# Patient Record
Sex: MALE | Race: BLACK OR AFRICAN AMERICAN | NOT HISPANIC OR LATINO | URBAN - METROPOLITAN AREA
[De-identification: names, ages, dates, MRNs, and addresses within clinical notes are randomized per-mention and may not be internally consistent; named-entity substitution may affect disease eponyms.]

---

## 2023-02-18 ENCOUNTER — EMERGENCY (EMERGENCY)
Facility: HOSPITAL | Age: 68
LOS: 1 days | Discharge: HOME | End: 2023-02-18
Attending: STUDENT IN AN ORGANIZED HEALTH CARE EDUCATION/TRAINING PROGRAM | Admitting: STUDENT IN AN ORGANIZED HEALTH CARE EDUCATION/TRAINING PROGRAM
Payer: MEDICARE

## 2023-02-18 VITALS
OXYGEN SATURATION: 100 % | HEART RATE: 92 BPM | SYSTOLIC BLOOD PRESSURE: 163 MMHG | DIASTOLIC BLOOD PRESSURE: 133 MMHG | RESPIRATION RATE: 19 BRPM | TEMPERATURE: 98 F

## 2023-02-18 VITALS
RESPIRATION RATE: 16 BRPM | TEMPERATURE: 98 F | SYSTOLIC BLOOD PRESSURE: 209 MMHG | OXYGEN SATURATION: 99 % | DIASTOLIC BLOOD PRESSURE: 141 MMHG | HEART RATE: 98 BPM

## 2023-02-18 PROCEDURE — 99284 EMERGENCY DEPT VISIT MOD MDM: CPT

## 2023-02-18 NOTE — ED PROVIDER NOTE - ATTENDING CONTRIBUTION TO CARE
I have personally performed a history and physical examination of the patient and discussed management with the resident as well as the patient.  I reviewed the resident's note and agree with the documented findings and plan of care.  I have authored and modified critical sections of the Provider Note, including but not limited to HPI, Physical Exam and MDM.    67-year-old male history of hypertension presenting for high blood pressure.  Patient routine checkup with nurse today with measured blood pressure to systolic 200s.  On presentation to ED pressure was 209/141.  Patient endorses compliance with home medications which she takes nightly.  Does measure blood pressures at home daily, typically to 130s over 80s, including last night taken 130s over 80s.  Currently asymptomatic at this time.  Patient has follow-up appointment in 5 days to reassess his blood pressure.  Repeat blood pressure elevated multiple times in the ED.  Instructed patient to take medications prescribed at this time and instructed importance of outpatient follow-up.  Patient verbalized agreement and understanding of current treatment plan.  Strict return precautions given to patient.

## 2023-02-18 NOTE — ED PROVIDER NOTE - NSFOLLOWUPINSTRUCTIONS_ED_ALL_ED_FT
You were seen in the ED for high blood pressure    Your examination showed no findings requiring further evaluation or treatment in the hospital at this time. Your blood pressure in the ED ranged from 188-209 / 124-141    Please follow up with your PCP as discussed within 1 week. You have an appointment on Thursday, please keep this appointment.    Seek immediate medical attention if you experience new or worsening symptoms, new vision changes, chest pain, difficulty breathing, or weakness/numbnes/tingling to your arms or legs.          WHAT YOU NEED TO KNOW:    What is hypertension? Hypertension is high blood pressure. Your blood pressure is the force of your blood moving against the walls of your arteries. Hypertension causes your blood pressure to get so high that your heart has to work much harder than normal. This can damage your heart. Hypertension that does not respond to medicines and lifestyle changes is called resistant hypertension. Hypertension is considered chronic when it continues for 3 months or longer.    What do I need to know about the stages of hypertension? Your healthcare provider will give you a blood pressure goal based on your age, health, and risk for cardiovascular disease. The following are general guidelines on the stages of hypertension:  •Normal blood pressure is 119/79 or lower. Your healthcare provider may only check your blood pressure each year if it stays at a normal level.      •Elevated blood pressure is 120/79 to 129/79. This is sometimes called prehypertension. Your healthcare provider may suggest lifestyle changes to help lower your blood pressure to a normal level. He or she may then check it again in 3 to 6 months.      •Stage 1 hypertension is 130/80 to 139/89. Your provider may recommend lifestyle changes, medication, and checks every 3 to 6 months until your blood pressure is controlled.      •Stage 2 hypertension is 140/90 or higher. Your provider will recommend lifestyle changes and have you take 2 kinds of hypertension medicines. You will also need to have your blood pressure checked monthly until it is controlled.    Blood Pressure Readings         What increases my risk for hypertension? The cause of hypertension may not be known. This is called essential or primary hypertension. Hypertension caused by another medical condition, such as kidney disease, is called secondary hypertension. Any of the following can increase your risk:  •Age older than 55 years (men) or 65 (women)      •Stress, or a family history of hypertension or heart disease      •Obesity, lack of exercise, or too many high-sodium foods      •Use of tobacco, alcohol, or illegal drugs      •A medical condition, such as diabetes, kidney disease, thyroid disease, or adrenal gland disorder      •Certain medicines, such as steroids or birth control pills      What are the signs and symptoms of hypertension? You may have no signs or symptoms, or you may have any of the following:  •Headache      •Blurred vision      •Chest pain      •Dizziness or weakness      •Trouble breathing      •Nosebleeds      How is hypertension diagnosed? Your healthcare provider will take your blood pressure at several visits. You may also need to check your blood pressure at home. The provider will examine you and ask about medicines you take. He or she will also ask if you have a family history of high blood pressure and about any health conditions you have. He or she will also check your blood pressure and weight and examine your heart, lungs, and eyes. You may need any of the following tests:  •An ambulatory blood pressure monitor (ABPM) is a device that you wear. ABPM measures your blood pressure while you do your regular daily activities. It records your blood pressure every 15 to 30 minutes during the day. It also records your blood pressure every 15 minutes to 1 hour at night. The recorded blood pressures help your healthcare provider know if you have hypertension not seen at your appointment.      •Blood tests may help healthcare providers find the cause of your hypertension. Blood tests can also help find other health problems caused by hypertension.      •Urine tests will be done to check your kidney function. Kidney problems can increase your risk for hypertension.      Which medicines are used to treat hypertension?   •Antihypertensives may be used to help lower your blood pressure. Several kinds of medicines are available. Your healthcare provider will choose medicines based on the kind of hypertension you have. You may need more than one type of medicine. Take the medicine exactly as directed.      •Diuretics help decrease extra fluid that collects in your body. This will help lower your blood pressure. You may urinate more often while you take this medicine.      •Cholesterol medicine helps lower your cholesterol level. A low cholesterol level helps prevent heart disease and makes it easier to control your blood pressure.      What can I do to manage hypertension?   •Check your blood pressure at home. Do not smoke, have caffeine, or exercise for at least 30 minutes before you check your blood pressure. Sit and rest for 5 minutes before you check your blood pressure. Extend your arm and support it on a flat surface. Your arm should be at the same level as your heart. Follow the directions that came with your blood pressure monitor. Check your blood pressure 2 times, 1 minute apart, before you take your medicine in the morning. Also check your blood pressure before your evening meal. Keep a record of your readings and bring it to your follow-up visits. Ask your healthcare provider what your blood pressure should be.  How to take a Blood Pressure           •Manage any other health conditions you have. Health conditions such as diabetes can increase your risk for hypertension. Follow your healthcare provider's instructions and take all your medicines as directed.      •Ask about all medicines. Certain medicines can increase your blood pressure. Examples include oral birth control pills, decongestants, herbal supplements, and NSAIDs, such as ibuprofen. Your healthcare provider can tell you which medicines are safe for you to take. This includes prescription and over-the-counter medicines.      What lifestyle changes can I make to manage hypertension? Your healthcare provider may recommend you work with a team to manage hypertension. The team may include medical experts such as a dietitian, an exercise or physical therapist, and a behavior therapist. Your family members may be included in helping you create lifestyle changes.  Ways to Lower Your Blood Pressure       •Limit sodium (salt) as directed. Too much sodium can affect your fluid balance. Check labels to find low-sodium or no-salt-added foods. Some low-sodium foods use potassium salts for flavor. Too much potassium can also cause health problems. Your healthcare provider will tell you how much sodium and potassium are safe for you to have in a day. He or she may recommend that you limit sodium to 2,300 mg a day.             •Follow the meal plan recommended by your healthcare provider. A dietitian or your provider can give you more information on low-sodium plans or the DASH (Dietary Approaches to Stop Hypertension) eating plan. The DASH plan is low in sodium, processed sugar, unhealthy fats, and total fat. It is high in potassium, calcium, and fiber. These can be found in vegetables, fruit, and whole-grain foods.             •Be physically active throughout the day. Physical activity, such as exercise, can help control your blood pressure and your weight. Be physically active for at least 30 minutes per day, on most days of the week. Include aerobic activity, such as walking or riding a bicycle. Also include strength training at least 2 times each week. Your healthcare providers can help you create a physical activity plan.  Ways to Be Physically Active       Strength Training for Adults           •Decrease stress. This may help lower your blood pressure. Learn ways to relax, such as deep breathing or listening to music.      •Limit alcohol as directed. Alcohol can increase your blood pressure. A drink of alcohol is 12 ounces of beer, 5 ounces of wine, or 1½ ounces of liquor.      •Do not smoke. Nicotine and other chemicals in cigarettes and cigars can increase your blood pressure and also cause lung damage. Ask your healthcare provider for information if you currently smoke and need help to quit. E-cigarettes or smokeless tobacco still contain nicotine. Talk to your healthcare provider before you use these products.  Prevent Heart Disease            Call your local emergency number (911 in the [place]) or have someone call if:   •You have chest pain.      •You have any of the following signs of a heart attack: ?Squeezing, pressure, or pain in your chest      ?You may also have any of the following: ?Discomfort or pain in your back, neck, jaw, stomach, or arm      ?Shortness of breath      ?Nausea or vomiting      ?Lightheadedness or a sudden cold sweat        •You become confused or have trouble speaking.      •You suddenly feel lightheaded or have trouble breathing.      When should I seek immediate care?   •You have a severe headache or vision loss.      •You have weakness in an arm or leg.      When should I call my doctor?   •You feel faint, dizzy, confused, or drowsy.      •You have been taking your blood pressure medicine but your pressure is higher than your provider says it should be.      •You have questions or concerns about your condition or care. You were seen in the ED for high blood pressure    Your examination showed no findings requiring further evaluation or treatment in the hospital at this time. Your blood pressure in the ED ranged from 188-209 / 124-141    Please follow up with your PCP as discussed within 1 week. You have an appointment on Thursday, please keep this appointment.    Seek immediate medical attention if you experience new or worsening symptoms, new vision changes, chest pain, difficulty breathing, or weakness/numbness/tingling to your arms or legs.          WHAT YOU NEED TO KNOW:    What is hypertension? Hypertension is high blood pressure. Your blood pressure is the force of your blood moving against the walls of your arteries. Hypertension causes your blood pressure to get so high that your heart has to work much harder than normal. This can damage your heart. Hypertension that does not respond to medicines and lifestyle changes is called resistant hypertension. Hypertension is considered chronic when it continues for 3 months or longer.    What do I need to know about the stages of hypertension? Your healthcare provider will give you a blood pressure goal based on your age, health, and risk for cardiovascular disease. The following are general guidelines on the stages of hypertension:  •Normal blood pressure is 119/79 or lower. Your healthcare provider may only check your blood pressure each year if it stays at a normal level.      •Elevated blood pressure is 120/79 to 129/79. This is sometimes called prehypertension. Your healthcare provider may suggest lifestyle changes to help lower your blood pressure to a normal level. He or she may then check it again in 3 to 6 months.      •Stage 1 hypertension is 130/80 to 139/89. Your provider may recommend lifestyle changes, medication, and checks every 3 to 6 months until your blood pressure is controlled.      •Stage 2 hypertension is 140/90 or higher. Your provider will recommend lifestyle changes and have you take 2 kinds of hypertension medicines. You will also need to have your blood pressure checked monthly until it is controlled.    Blood Pressure Readings         What increases my risk for hypertension? The cause of hypertension may not be known. This is called essential or primary hypertension. Hypertension caused by another medical condition, such as kidney disease, is called secondary hypertension. Any of the following can increase your risk:  •Age older than 55 years (men) or 65 (women)      •Stress, or a family history of hypertension or heart disease      •Obesity, lack of exercise, or too many high-sodium foods      •Use of tobacco, alcohol, or illegal drugs      •A medical condition, such as diabetes, kidney disease, thyroid disease, or adrenal gland disorder      •Certain medicines, such as steroids or birth control pills      What are the signs and symptoms of hypertension? You may have no signs or symptoms, or you may have any of the following:  •Headache      •Blurred vision      •Chest pain      •Dizziness or weakness      •Trouble breathing      •Nosebleeds      How is hypertension diagnosed? Your healthcare provider will take your blood pressure at several visits. You may also need to check your blood pressure at home. The provider will examine you and ask about medicines you take. He or she will also ask if you have a family history of high blood pressure and about any health conditions you have. He or she will also check your blood pressure and weight and examine your heart, lungs, and eyes. You may need any of the following tests:  •An ambulatory blood pressure monitor (ABPM) is a device that you wear. ABPM measures your blood pressure while you do your regular daily activities. It records your blood pressure every 15 to 30 minutes during the day. It also records your blood pressure every 15 minutes to 1 hour at night. The recorded blood pressures help your healthcare provider know if you have hypertension not seen at your appointment.      •Blood tests may help healthcare providers find the cause of your hypertension. Blood tests can also help find other health problems caused by hypertension.      •Urine tests will be done to check your kidney function. Kidney problems can increase your risk for hypertension.      Which medicines are used to treat hypertension?   •Antihypertensives may be used to help lower your blood pressure. Several kinds of medicines are available. Your healthcare provider will choose medicines based on the kind of hypertension you have. You may need more than one type of medicine. Take the medicine exactly as directed.      •Diuretics help decrease extra fluid that collects in your body. This will help lower your blood pressure. You may urinate more often while you take this medicine.      •Cholesterol medicine helps lower your cholesterol level. A low cholesterol level helps prevent heart disease and makes it easier to control your blood pressure.      What can I do to manage hypertension?   •Check your blood pressure at home. Do not smoke, have caffeine, or exercise for at least 30 minutes before you check your blood pressure. Sit and rest for 5 minutes before you check your blood pressure. Extend your arm and support it on a flat surface. Your arm should be at the same level as your heart. Follow the directions that came with your blood pressure monitor. Check your blood pressure 2 times, 1 minute apart, before you take your medicine in the morning. Also check your blood pressure before your evening meal. Keep a record of your readings and bring it to your follow-up visits. Ask your healthcare provider what your blood pressure should be.  How to take a Blood Pressure           •Manage any other health conditions you have. Health conditions such as diabetes can increase your risk for hypertension. Follow your healthcare provider's instructions and take all your medicines as directed.      •Ask about all medicines. Certain medicines can increase your blood pressure. Examples include oral birth control pills, decongestants, herbal supplements, and NSAIDs, such as ibuprofen. Your healthcare provider can tell you which medicines are safe for you to take. This includes prescription and over-the-counter medicines.      What lifestyle changes can I make to manage hypertension? Your healthcare provider may recommend you work with a team to manage hypertension. The team may include medical experts such as a dietitian, an exercise or physical therapist, and a behavior therapist. Your family members may be included in helping you create lifestyle changes.  Ways to Lower Your Blood Pressure       •Limit sodium (salt) as directed. Too much sodium can affect your fluid balance. Check labels to find low-sodium or no-salt-added foods. Some low-sodium foods use potassium salts for flavor. Too much potassium can also cause health problems. Your healthcare provider will tell you how much sodium and potassium are safe for you to have in a day. He or she may recommend that you limit sodium to 2,300 mg a day.             •Follow the meal plan recommended by your healthcare provider. A dietitian or your provider can give you more information on low-sodium plans or the DASH (Dietary Approaches to Stop Hypertension) eating plan. The DASH plan is low in sodium, processed sugar, unhealthy fats, and total fat. It is high in potassium, calcium, and fiber. These can be found in vegetables, fruit, and whole-grain foods.             •Be physically active throughout the day. Physical activity, such as exercise, can help control your blood pressure and your weight. Be physically active for at least 30 minutes per day, on most days of the week. Include aerobic activity, such as walking or riding a bicycle. Also include strength training at least 2 times each week. Your healthcare providers can help you create a physical activity plan.  Ways to Be Physically Active       Strength Training for Adults           •Decrease stress. This may help lower your blood pressure. Learn ways to relax, such as deep breathing or listening to music.      •Limit alcohol as directed. Alcohol can increase your blood pressure. A drink of alcohol is 12 ounces of beer, 5 ounces of wine, or 1½ ounces of liquor.      •Do not smoke. Nicotine and other chemicals in cigarettes and cigars can increase your blood pressure and also cause lung damage. Ask your healthcare provider for information if you currently smoke and need help to quit. E-cigarettes or smokeless tobacco still contain nicotine. Talk to your healthcare provider before you use these products.  Prevent Heart Disease            Call your local emergency number (911 in the [place]) or have someone call if:   •You have chest pain.      •You have any of the following signs of a heart attack: ?Squeezing, pressure, or pain in your chest      ?You may also have any of the following: ?Discomfort or pain in your back, neck, jaw, stomach, or arm      ?Shortness of breath      ?Nausea or vomiting      ?Lightheadedness or a sudden cold sweat        •You become confused or have trouble speaking.      •You suddenly feel lightheaded or have trouble breathing.      When should I seek immediate care?   •You have a severe headache or vision loss.      •You have weakness in an arm or leg.      When should I call my doctor?   •You feel faint, dizzy, confused, or drowsy.      •You have been taking your blood pressure medicine but your pressure is higher than your provider says it should be.      •You have questions or concerns about your condition or care.

## 2023-02-18 NOTE — ED ADULT NURSE REASSESSMENT NOTE - NS ED NURSE REASSESS COMMENT FT1
A&Ox4. PMH HTN. Patient asymptomatic. Denies headache, dizziness, or SOB. Respiration even and unlabored, sating at 100% on RA. Normal sinus rhythm on monitor. MD presented patient with discharge paperwork, explained to return to ED if feeling symptomatic, and asked to follow up with cardiologist.

## 2023-02-18 NOTE — ED PROVIDER NOTE - PHYSICAL EXAMINATION
CONSTITUTIONAL: Well-developed; well-nourished; in no acute distress.   SKIN: warm, dry  HEAD: Normocephalic; atraumatic.  EYES: no conjunctival injection. PERRL.   ENT: No nasal discharge; airway clear.  NECK: Supple; non tender.  CARD: S1, S2 normal; no murmurs, gallops, or rubs. Regular rate and rhythm.   RESP: No wheezes, rales or rhonchi. Good air movement bilaterally.   ABD: soft ntnd, no guarding, no distention, no rigidity.   EXT: Ambulates independently.  No cyanosis or edema.   NEURO: alert, oriented to ppte, cn 2-12 intact, motor strength 5/5 throughout, sensation intact throughout, coordination intact, romberg wnl, gait normal   PSYCH: Cooperative, appropriate.

## 2023-02-18 NOTE — ED PROVIDER NOTE - OBJECTIVE STATEMENT
Patient is a 67-year-old male history of hypertension presenting for high blood pressure.  Patient routine checkup with nurse today with measured blood pressure to systolic 200s.  On presentation to ED pressure was 209/141.  Patient for hypertension takes telmisartan nightly, has been taking medications as instructed including last night.  Does measure blood pressures at home daily, typically to 130s over 80s, including last night taken 130s over 80s.  In the past has had systolic and diastolic hypertension while at doctor's offices typically to 180s systolic, was concerned that today's number was higher and presented to ED.  Denies any past or current vision changes, headache, dizziness, numbness, weakness, tingling, nausea, vomiting, chest pain, shortness of breath, back pain, palpitations, abdominal pain, urinary changes, stool changes, fever, chills, sick contacts.  Feels well otherwise.  Has an appointment with his PCP this Thursday.

## 2023-02-18 NOTE — ED ADULT NURSE NOTE - OBJECTIVE STATEMENT
Patient A&o X4, history of HTN, received in room 14, with complaints of elevated BP. Patient states, "I had my blood pressure checked this morning by an [employer] nurse and she told me it was high". Patient is currently hypertensive during assessment, denies any blurry vision or dizziness at this time. Patient denies having a headache, no further neurological deficits noted. Patient able to speak in clear sentences, respirations equal and unlabored. Lung sounds clear b/l, equal chest rise and fall noted. Denies CP/SOB, fever, chills, nausea, vomiting and diarrhea at this time. Skin warm and dry. Provider at bedside for eval, pending further orders. Patient A&o X4, history of HTN, received in room 14, with complaints of elevated BP. Patient states, "I had my blood pressure checked this morning by an [employer] nurse and she told me it was high". Patient is currently hypertensive during assessment, denies any blurry vision or dizziness at this time. Patient denies having a headache, no further neurological deficits noted. Patient admits to taking Telmisartan 40mg daily for HTN, reports compliance with medication. Patient able to speak in clear sentences, respirations equal and unlabored. Lung sounds clear b/l, equal chest rise and fall noted. Denies CP/SOB, fever, chills, nausea, vomiting and diarrhea at this time. Skin warm and dry. Provider at bedside for eval, pending further orders.

## 2023-02-18 NOTE — ED ADULT TRIAGE NOTE - CHIEF COMPLAINT QUOTE
pt had a home visit check up for his health insurance purposes and when BP was taken, pt states BP was noted to be SBP 200s. Pt states whenever someone else checks his blood pressure he gets anxious and reading is always high. Pt denies any headache , pain or dizziness. Hx HTN on [place]ard[place]

## 2023-02-18 NOTE — ED PROVIDER NOTE - PROGRESS NOTE DETAILS
Patient was re-evaluated and doing well. Results, including any incidental findings, were discussed. Return precautions and follow up were discussed. Patient verbalized understanding.  pt remains asymptomatic

## 2023-02-18 NOTE — ED PROVIDER NOTE - CLINICAL SUMMARY MEDICAL DECISION MAKING FREE TEXT BOX
Patient is a 67-year-old male history of hypertension presenting for high blood pressure. Currently with HTN to 209/141, vitals otherwise wnl, with complaint of elevated BP today. Pattern of elevation c/w white coat htn, pt taking telmisartan nightly and no missed doses, BP within acceptable limits when measured at home. Asymptomatic with no subjective or objective findings of end organ dysfunction or neurovascular compromise. Discussed follow up, pt has appointment with pcp on Thursday, follows appointments well. Discussed BP monitoring, diet, exercise, adherence to medications.

## 2024-10-22 NOTE — ED PROVIDER NOTE - PATIENT PORTAL LINK FT
General Sunscreen Counseling: I recommended a broad spectrum sunscreen with a SPF of 30 or higher.  I explained that SPF 30 sunscreens block approximately 97 percent of the sun's harmful rays.  Sunscreens should be applied at least 15 minutes prior to expected sun exposure and then every 2 hours after that as long as sun exposure continues. If swimming or exercising sunscreen should be reapplied every 45 minutes to an hour after getting wet or sweating.  One ounce, or the equivalent of a shot glass full of sunscreen, is adequate to protect the skin not covered by a bathing suit. I also recommended a lip balm with a sunscreen as well. Sun protective clothing can be used in lieu of sunscreen but must be worn the entire time you are exposed to the sun's rays.
Detail Level: Detailed
You can access the FollowMyHealth Patient Portal offered by [facility] by registering at the following website: [email or web address]. By joining [employer]’s FollowMyHealth portal, you will also be able to view your health information using other applications (apps) compatible with our system.